# Patient Record
Sex: MALE | Race: WHITE | NOT HISPANIC OR LATINO | Employment: FULL TIME | URBAN - METROPOLITAN AREA
[De-identification: names, ages, dates, MRNs, and addresses within clinical notes are randomized per-mention and may not be internally consistent; named-entity substitution may affect disease eponyms.]

---

## 2023-11-06 PROBLEM — L03.90 CELLULITIS: Status: ACTIVE | Noted: 2023-11-06

## 2023-11-06 PROBLEM — M25.579 ANKLE JOINT PAIN: Status: ACTIVE | Noted: 2023-11-06

## 2023-11-06 PROBLEM — Z00.6 RESEARCH SUBJECT: Status: ACTIVE | Noted: 2023-11-06

## 2023-11-06 PROBLEM — S89.90XA LEG INJURY: Status: ACTIVE | Noted: 2023-11-06

## 2023-11-06 PROBLEM — S59.901A INJURY OF ELBOW, RIGHT: Status: ACTIVE | Noted: 2023-11-06

## 2023-11-06 PROBLEM — M25.571 RIGHT ANKLE PAIN: Status: ACTIVE | Noted: 2023-11-06

## 2023-11-06 RX ORDER — LISINOPRIL 10 MG/1
10 TABLET ORAL
COMMUNITY
Start: 2023-06-01

## 2023-11-06 RX ORDER — ALLOPURINOL 100 MG/1
100 TABLET ORAL
COMMUNITY
Start: 2023-06-01

## 2023-11-07 ENCOUNTER — LAB REQUISITION (OUTPATIENT)
Dept: LAB | Facility: HOSPITAL | Age: 48
End: 2023-11-07
Payer: COMMERCIAL

## 2023-11-07 ENCOUNTER — HOSPITAL ENCOUNTER (OUTPATIENT)
Dept: CARDIOLOGY | Facility: HOSPITAL | Age: 48
Discharge: HOME | End: 2023-11-07
Payer: COMMERCIAL

## 2023-11-07 ENCOUNTER — HOSPITAL ENCOUNTER (OUTPATIENT)
Dept: RADIOLOGY | Facility: HOSPITAL | Age: 48
Discharge: HOME | End: 2023-11-07
Payer: COMMERCIAL

## 2023-11-07 ENCOUNTER — CLINICAL SUPPORT (OUTPATIENT)
Dept: IMMUNOLOGY | Facility: CLINIC | Age: 48
End: 2023-11-07
Payer: COMMERCIAL

## 2023-11-07 DIAGNOSIS — Z00.6 ENCOUNTER FOR EXAMINATION FOR NORMAL COMPARISON AND CONTROL IN CLINICAL RESEARCH PROGRAM: ICD-10-CM

## 2023-11-07 DIAGNOSIS — Z00.6 RESEARCH STUDY PATIENT: ICD-10-CM

## 2023-11-07 LAB
25(OH)D3 SERPL-MCNC: 19 NG/ML (ref 30–100)
APPEARANCE UR: CLEAR
BILIRUB UR STRIP.AUTO-MCNC: NEGATIVE MG/DL
CHOLEST SERPL-MCNC: 209 MG/DL (ref 0–199)
CHOLESTEROL/HDL RATIO: 7.3
COLOR UR: YELLOW
CRP SERPL-MCNC: 0.28 MG/DL
GLUCOSE UR STRIP.AUTO-MCNC: NEGATIVE MG/DL
HDLC SERPL-MCNC: 28.5 MG/DL
KETONES UR STRIP.AUTO-MCNC: NEGATIVE MG/DL
LDLC SERPL CALC-MCNC: 123 MG/DL
LEUKOCYTE ESTERASE UR QL STRIP.AUTO: NEGATIVE
NITRITE UR QL STRIP.AUTO: NEGATIVE
NON HDL CHOLESTEROL: 181 MG/DL (ref 0–149)
PH UR STRIP.AUTO: 6 [PH]
PROT UR STRIP.AUTO-MCNC: NEGATIVE MG/DL
RBC # UR STRIP.AUTO: ABNORMAL /UL
RBC #/AREA URNS AUTO: NORMAL /HPF
SP GR UR STRIP.AUTO: 1.02
TRIGL SERPL-MCNC: 287 MG/DL (ref 0–149)
UROBILINOGEN UR STRIP.AUTO-MCNC: <2 MG/DL
VLDL: 57 MG/DL (ref 0–40)
WBC #/AREA URNS AUTO: NORMAL /HPF

## 2023-11-07 PROCEDURE — 82306 VITAMIN D 25 HYDROXY: CPT

## 2023-11-07 PROCEDURE — 81001 URINALYSIS AUTO W/SCOPE: CPT

## 2023-11-07 PROCEDURE — 93010 ELECTROCARDIOGRAM REPORT: CPT | Performed by: INTERNAL MEDICINE

## 2023-11-07 PROCEDURE — 70553 MRI BRAIN STEM W/O & W/DYE: CPT | Mod: ONBASE | Performed by: RADIOLOGY

## 2023-11-07 PROCEDURE — 93005 ELECTROCARDIOGRAM TRACING: CPT

## 2023-11-07 PROCEDURE — 70553 MRI BRAIN STEM W/O & W/DYE: CPT | Mod: ONBASE

## 2023-11-07 PROCEDURE — A9575 INJ GADOTERATE MEGLUMI 0.1ML: HCPCS | Performed by: INTERNAL MEDICINE

## 2023-11-07 PROCEDURE — 86140 C-REACTIVE PROTEIN: CPT

## 2023-11-07 PROCEDURE — 80061 LIPID PANEL: CPT

## 2023-11-07 PROCEDURE — 2550000001 HC RX 255 CONTRASTS: Performed by: INTERNAL MEDICINE

## 2023-11-07 PROCEDURE — 83036 HEMOGLOBIN GLYCOSYLATED A1C: CPT

## 2023-11-07 PROCEDURE — 71250 CT THORAX DX C-: CPT | Mod: ONBASE | Performed by: STUDENT IN AN ORGANIZED HEALTH CARE EDUCATION/TRAINING PROGRAM

## 2023-11-07 PROCEDURE — 71250 CT THORAX DX C-: CPT | Mod: ONBASE

## 2023-11-07 RX ORDER — GADOTERATE MEGLUMINE 376.9 MG/ML
15 INJECTION INTRAVENOUS
Status: COMPLETED | OUTPATIENT
Start: 2023-11-07 | End: 2023-11-07

## 2023-11-07 RX ADMIN — GADOTERATE MEGLUMINE 26 ML: 376.9 INJECTION INTRAVENOUS at 11:34

## 2023-11-08 LAB
ATRIAL RATE: 83 BPM
EST. AVERAGE GLUCOSE BLD GHB EST-MCNC: 114 MG/DL
HBA1C MFR BLD: 5.6 %
P AXIS: 1 DEGREES
P OFFSET: 186 MS
P ONSET: 139 MS
PR INTERVAL: 142 MS
Q ONSET: 210 MS
QRS COUNT: 13 BEATS
QRS DURATION: 86 MS
QT INTERVAL: 352 MS
QTC CALCULATION(BAZETT): 413 MS
QTC FREDERICIA: 392 MS
R AXIS: -38 DEGREES
T AXIS: 117 DEGREES
T OFFSET: 386 MS
VENTRICULAR RATE: 83 BPM

## 2023-11-29 ENCOUNTER — APPOINTMENT (OUTPATIENT)
Dept: OCCUPATIONAL THERAPY | Facility: HOSPITAL | Age: 48
End: 2023-11-29
Payer: COMMERCIAL

## 2024-04-03 DIAGNOSIS — Z00.6 RESEARCH STUDY PATIENT: ICD-10-CM

## 2024-06-03 ENCOUNTER — CLINICAL SUPPORT (OUTPATIENT)
Dept: GASTROENTEROLOGY | Facility: HOSPITAL | Age: 49
End: 2024-06-03

## 2024-06-03 DIAGNOSIS — Z00.6 RESEARCH STUDY PATIENT: ICD-10-CM

## 2024-06-03 PROCEDURE — 91200 LIVER ELASTOGRAPHY: CPT | Performed by: INTERNAL MEDICINE

## 2024-06-03 NOTE — LETTER
June 4, 2024     Farzaneh Winslow MD  [address] [facility] [address]    Patient: Ced Costa   YOB: 1975   Date of Visit: 6/3/2024       Dear Dr. Farzaneh Winslow MD:    Thank you for referring Ced Costa to me for evaluation. Below are my notes for this consultation.  If you have questions, please do not hesitate to call me. I look forward to following your patient along with you.       Sincerely,     [id number]      CC: No Recipients  ______________________________________________________________________________________      Results:  E (median liver stiffness measurement):  8.6    kPa  CAP (controlled attenuation parameter):  366  dB/m    Interpretation:  This was a technically adequate study. 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 The CAP score is consistent with 67 - 100 % hepatocyte steatosis (steatosis stage 3 of 3 ) .    Jeancarlos Rutledge MD  Hepatology

## 2024-06-04 NOTE — PROGRESS NOTES
Results:  E (median liver stiffness measurement):  8.6    kPa  CAP (controlled attenuation parameter):  366  dB/m    Interpretation:  This was a technically adequate study. 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 The CAP score is consistent with 67 - 100 % hepatocyte steatosis (steatosis stage 3 of 3 ) .    Jeancarlos Rutledge MD  Hepatology

## 2024-06-11 ENCOUNTER — DOCUMENTATION (OUTPATIENT)
Dept: IMMUNOLOGY | Facility: CLINIC | Age: 49
End: 2024-06-11
Payer: COMMERCIAL

## 2024-06-11 NOTE — PROGRESS NOTES
Telephone note:  left message for participant to call me back regarding the results of the Fibroscan done as part of the Covid Recover study.

## 2024-06-14 ENCOUNTER — DOCUMENTATION (OUTPATIENT)
Dept: IMMUNOLOGY | Facility: CLINIC | Age: 49
End: 2024-06-14
Payer: COMMERCIAL

## 2024-06-14 NOTE — PROGRESS NOTES
Telephone note - participant notified of results of fibroscan done as part of the Recover Covid study:  moderate fibrosis; severe steatosis.  Encouraged to follow up with PCP.    Trevor

## 2024-07-29 ENCOUNTER — LAB (OUTPATIENT)
Dept: LAB | Facility: LAB | Age: 49
End: 2024-07-29

## 2024-07-29 DIAGNOSIS — Z00.6 RESEARCH EXAM: Primary | ICD-10-CM

## 2024-07-29 DIAGNOSIS — Z00.6 RESEARCH EXAM: ICD-10-CM

## 2024-07-29 DIAGNOSIS — Z00.6 RESEARCH STUDY PATIENT: ICD-10-CM

## 2024-07-29 LAB
CREAT UR-MCNC: 206.7 MG/DL (ref 20–370)
MICROALBUMIN UR-MCNC: 26.1 MG/L
MICROALBUMIN/CREAT UR: 12.6 UG/MG CREAT

## 2024-07-29 PROCEDURE — 82570 ASSAY OF URINE CREATININE: CPT

## 2024-07-29 PROCEDURE — 82043 UR ALBUMIN QUANTITATIVE: CPT

## 2024-07-31 ENCOUNTER — LAB (OUTPATIENT)
Dept: LAB | Facility: LAB | Age: 49
End: 2024-07-31

## 2024-07-31 DIAGNOSIS — Z00.6 RESEARCH STUDY PATIENT: ICD-10-CM

## 2024-07-31 DIAGNOSIS — Z00.6 RESEARCH EXAM: ICD-10-CM

## 2024-07-31 DIAGNOSIS — Z00.6 RESEARCH EXAM: Primary | ICD-10-CM

## 2024-07-31 LAB
CREAT UR-MCNC: 141.7 MG/DL (ref 20–370)
CREAT UR-MCNC: 205.4 MG/DL (ref 20–370)
MICROALBUMIN UR-MCNC: 29.2 MG/L
MICROALBUMIN UR-MCNC: 29.5 MG/L
MICROALBUMIN/CREAT UR: 14.2 UG/MG CREAT
MICROALBUMIN/CREAT UR: 20.8 UG/MG CREAT

## 2024-07-31 PROCEDURE — 82570 ASSAY OF URINE CREATININE: CPT

## 2024-07-31 PROCEDURE — 82043 UR ALBUMIN QUANTITATIVE: CPT

## 2024-11-27 ENCOUNTER — LAB (OUTPATIENT)
Dept: LAB | Facility: LAB | Age: 49
End: 2024-11-27
Payer: COMMERCIAL

## 2024-11-27 DIAGNOSIS — Z00.6 RESEARCH STUDY PATIENT: ICD-10-CM

## 2024-11-27 LAB
25(OH)D3 SERPL-MCNC: 12 NG/ML (ref 30–100)
APPEARANCE UR: CLEAR
BILIRUB UR STRIP.AUTO-MCNC: NEGATIVE MG/DL
CHOLEST SERPL-MCNC: 200 MG/DL (ref 0–199)
CHOLESTEROL/HDL RATIO: 5.8
COLOR UR: ABNORMAL
EST. AVERAGE GLUCOSE BLD GHB EST-MCNC: 117 MG/DL
GLUCOSE UR STRIP.AUTO-MCNC: NORMAL MG/DL
HBA1C MFR BLD: 5.7 %
HDLC SERPL-MCNC: 34.2 MG/DL
KETONES UR STRIP.AUTO-MCNC: NEGATIVE MG/DL
LDLC SERPL CALC-MCNC: 95 MG/DL
LEUKOCYTE ESTERASE UR QL STRIP.AUTO: NEGATIVE
MUCOUS THREADS #/AREA URNS AUTO: NORMAL /LPF
NITRITE UR QL STRIP.AUTO: NEGATIVE
NON HDL CHOLESTEROL: 166 MG/DL (ref 0–149)
PH UR STRIP.AUTO: 5.5 [PH]
PROT UR STRIP.AUTO-MCNC: ABNORMAL MG/DL
RBC # UR STRIP.AUTO: ABNORMAL /UL
RBC #/AREA URNS AUTO: NORMAL /HPF
SP GR UR STRIP.AUTO: 1.02
SQUAMOUS #/AREA URNS AUTO: NORMAL /HPF
TRIGL SERPL-MCNC: 356 MG/DL (ref 0–149)
UROBILINOGEN UR STRIP.AUTO-MCNC: NORMAL MG/DL
VLDL: 71 MG/DL (ref 0–40)
WBC #/AREA URNS AUTO: NORMAL /HPF

## 2024-11-27 PROCEDURE — 80061 LIPID PANEL: CPT

## 2024-11-27 PROCEDURE — 81001 URINALYSIS AUTO W/SCOPE: CPT

## 2024-11-27 PROCEDURE — 82306 VITAMIN D 25 HYDROXY: CPT

## 2024-11-27 PROCEDURE — 36415 COLL VENOUS BLD VENIPUNCTURE: CPT

## 2024-11-27 PROCEDURE — 83036 HEMOGLOBIN GLYCOSYLATED A1C: CPT

## 2024-12-30 ENCOUNTER — OFFICE VISIT (OUTPATIENT)
Dept: SPORTS MEDICINE | Facility: HOSPITAL | Age: 49
End: 2024-12-30
Payer: COMMERCIAL

## 2024-12-30 ENCOUNTER — HOSPITAL ENCOUNTER (OUTPATIENT)
Dept: RADIOLOGY | Facility: HOSPITAL | Age: 49
Discharge: HOME | End: 2024-12-30
Payer: COMMERCIAL

## 2024-12-30 VITALS — DIASTOLIC BLOOD PRESSURE: 99 MMHG | SYSTOLIC BLOOD PRESSURE: 132 MMHG | HEART RATE: 77 BPM | OXYGEN SATURATION: 99 %

## 2024-12-30 DIAGNOSIS — M77.11 LATERAL EPICONDYLITIS OF RIGHT ELBOW: Primary | ICD-10-CM

## 2024-12-30 DIAGNOSIS — M25.521 RIGHT ELBOW PAIN: ICD-10-CM

## 2024-12-30 PROCEDURE — 73080 X-RAY EXAM OF ELBOW: CPT | Mod: RT

## 2024-12-30 PROCEDURE — 99214 OFFICE O/P EST MOD 30 MIN: CPT | Performed by: PEDIATRICS

## 2024-12-30 PROCEDURE — 73080 X-RAY EXAM OF ELBOW: CPT | Mod: RIGHT SIDE | Performed by: RADIOLOGY

## 2024-12-30 PROCEDURE — 99204 OFFICE O/P NEW MOD 45 MIN: CPT | Performed by: PEDIATRICS

## 2024-12-30 RX ORDER — NAPROXEN 500 MG/1
500 TABLET ORAL 2 TIMES DAILY
Qty: 60 TABLET | Refills: 0 | Status: SHIPPED | OUTPATIENT
Start: 2024-12-30 | End: 2025-01-29

## 2024-12-30 NOTE — LETTER
December 31, 2024     Cheryl Hsu MD  [address] [place] [address]   [facility], [address]  [place] [address]    Patient: Ced Costa   YOB: 1975   Date of Visit: 12/30/2024       Dear Dr. Cheryl Hsu MD:    Thank you for referring Ced Costa to me for evaluation. Below are my notes for this consultation.  If you have questions, please do not hesitate to call me. I look forward to following your patient along with you.       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    Pain outside R elbow for the past 3 weeks.  Pain when playing golf.  Pain bending and straightening.  Has applied ice to the R elbow.  Taking OTC advil as needed. No numbness or tingling.  Aching in the morning if he sleeps on the R arm. No weakness.     Plays golf.  Indoor league golfs once weekly.  Has not been golfing the past few weeks to avoid making pain worse.      Some pain at rest. Feels tight. No symptoms left.         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     • lisinopril 10 mg tablet Take 1 tablet (10 mg) by mouth once daily.       No current facility-administered medications on file prior to visit.         Allergies:  No Known Allergies     Physical Exam:    There were no vitals taken for this visit.     Vitals reviewed    General appearance: Well-appearing well-nourished  Psych: Normal mood and affect    Neuro: Normal sensation to light touch throughout the involved extremities  Vascular: No extremity edema or discoloration.  Skin: negative.  Lymphatic: no regional lymphadenopathy present.  Eyes: no conjunctival injection.      BILATERAL  ELBOW EXAM    Inspection:   Soft tissue swelling: No  Erythema: No  Effusion: No  Deformity: No    Range of motion (normal):  Flexion (130-150) full, no pain   Extension (0) full, no pain   Supination (80) full, no pain  Pronation (85) full, no pain     Palpation:  TTP Medial epicondyle no  TTP Ulnar collateral ligament no  TTP Flexor/pronator mass no  TTP Lateral epicondyle R  TTP Common extensor tendon origin R  TTP Radial head no  TTP Olecranon no  TTP Cubital tunnel / ulnar nerve no  TTP Distal biceps tendon no  TTP Proximal ulna no  TTP Proximal radius no  TTP Distal humerus no    Strength:   Flexion pain free, 5/5  Extension pain  free, 5/5  Supination pain free, 5/5  Pronation pain free, 5/5  Thumb extension pain free, 5/5  Wrist extension R lateral epicondyle pain free, 5/5  Wrist flexion pain free, 5/5   strength pain R lateral epicondyle, 5/5  Interosseous M strength pain free, 5/5    Ligament and Special tests:   Forced extension maneuver: negative   Valgus stress test at 30 for pain / laxity: negative   Milking maneuver: negative   Valgus overload extension test: negative     Nerve test:  Negative Tinel's cubital tunnel    Normal Sensation:  C8 dermatome/ulnar nerve: small finger  C7  dermatome/meidan nerve: middle finger  C6 dermatome/radial nerve: thumb       Imaging:  R elbow xrays were reviewed. Osteophytes noted. No fracture identified.   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 Exam notable for TTP lateral epicondyle of the R elbow, pain lateral epicondyle with extension of wrist.     We have recommended the following course of action:  1. Elbow strap was recommended during activity.  Wearing a neutral wrist brace when sleeping may also relieve pain at the elbow.   2. Rest from golfing, weight lifting and sport activities that cause pain to the elbow.  3. Referral to formal occupational therapy to work on shoulder and upper back strength.  This will help to improve mechanics once the patient is pain free.  4. The patient understands that injury to this area of the elbow is frequently related to improper technique. As the patient is permitted to return to activity, evaluating form is important to prevent re-injury.   Golfing injuries commonly occur due to improper form and/or advancing activity too quickly.  Working with a  or  is advised to supervise form when returning.   5. Anti-inflammatory medications were prescribed for pain relief.    Follow up in 6 weeks.      I saw and evaluated the patient. I personally obtained the key and critical portions of the history and physical exam or was physically present for key and critical portions performed by the resident/fellow. I reviewed the resident/fellow's documentation and discussed the patient with the resident/fellow. I agree with the resident/fellow's medical decision making as documented in the note.        ** Please excuse any errors in grammar or translation related to this dictation. Voice recognition software was utilized to  prepare this document. **

## 2024-12-30 NOTE — PROGRESS NOTES
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    Pain outside R elbow for the past 3 weeks.  Pain when playing golf.  Pain bending and straightening.  Has applied ice to the R elbow.  Taking OTC advil as needed. No numbness or tingling.  Aching in the morning if he sleeps on the R arm. No weakness.     Plays golf.  Indoor league golfs once weekly.  Has not been golfing the past few weeks to avoid making pain worse.      Some pain at rest. Feels tight. No symptoms left.         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      lisinopril 10 mg tablet Take 1 tablet (10 mg) by mouth once daily.       No current facility-administered medications on file prior to visit.         Allergies:  No Known Allergies     Physical Exam:    There were no vitals taken for this visit.     Vitals reviewed    General appearance: Well-appearing well-nourished  Psych: Normal mood and affect    Neuro: Normal sensation to light touch throughout the involved extremities  Vascular: No extremity edema or discoloration.  Skin:  negative.  Lymphatic: no regional lymphadenopathy present.  Eyes: no conjunctival injection.      BILATERAL  ELBOW EXAM    Inspection:   Soft tissue swelling: No  Erythema: No  Effusion: No  Deformity: No    Range of motion (normal):  Flexion (130-150) full, no pain   Extension (0) full, no pain   Supination (80) full, no pain  Pronation (85) full, no pain     Palpation:  TTP Medial epicondyle no  TTP Ulnar collateral ligament no  TTP Flexor/pronator mass no  TTP Lateral epicondyle R  TTP Common extensor tendon origin R  TTP Radial head no  TTP Olecranon no  TTP Cubital tunnel / ulnar nerve no  TTP Distal biceps tendon no  TTP Proximal ulna no  TTP Proximal radius no  TTP Distal humerus no    Strength:   Flexion pain free, 5/5  Extension pain  free, 5/5  Supination pain free, 5/5  Pronation pain free, 5/5  Thumb extension pain free, 5/5  Wrist extension R lateral epicondyle pain free, 5/5  Wrist flexion pain free, 5/5   strength pain R lateral epicondyle, 5/5  Interosseous M strength pain free, 5/5    Ligament and Special tests:   Forced extension maneuver: negative   Valgus stress test at 30 for pain / laxity: negative   Milking maneuver: negative   Valgus overload extension test: negative     Nerve test:  Negative Tinel's cubital tunnel    Normal Sensation:  C8 dermatome/ulnar nerve: small finger  C7 dermatome/meidan nerve: middle finger  C6 dermatome/radial nerve: thumb       Imaging:  R elbow xrays were reviewed. Osteophytes noted. No fracture identified.   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 Exam notable for TTP lateral epicondyle of the R elbow, pain lateral epicondyle with extension of wrist.     We have recommended the following course of action:  1. Elbow strap was recommended during activity.  Wearing a neutral  wrist brace when sleeping may also relieve pain at the elbow.   2. Rest from golfing, weight lifting and sport activities that cause pain to the elbow.  3. Referral to formal occupational therapy to work on shoulder and upper back strength.  This will help to improve mechanics once the patient is pain free.  4. The patient understands that injury to this area of the elbow is frequently related to improper technique. As the patient is permitted to return to activity, evaluating form is important to prevent re-injury.   Golfing injuries commonly occur due to improper form and/or advancing activity too quickly.  Working with a  or  is advised to supervise form when returning.   5. Anti-inflammatory medications were prescribed for pain relief.    Follow up in 6 weeks.      I saw and evaluated the patient. I personally obtained the key and critical portions of the history and physical exam or was physically present for key and critical portions performed by the resident/fellow. I reviewed the resident/fellow's documentation and discussed the patient with the resident/fellow. I agree with the resident/fellow's medical decision making as documented in the note.        ** Please excuse any errors in grammar or translation related to this dictation. Voice recognition software was utilized to prepare this document. **

## 2025-01-24 DIAGNOSIS — Z00.6 RESEARCH EXAM: ICD-10-CM

## 2025-01-24 RX ORDER — ALBUTEROL SULFATE 0.83 MG/ML
3 SOLUTION RESPIRATORY (INHALATION) ONCE
OUTPATIENT
Start: 2025-01-24 | End: 2025-01-24

## 2025-01-24 RX ORDER — ALBUTEROL SULFATE 90 UG/1
4 INHALANT RESPIRATORY (INHALATION) ONCE
OUTPATIENT
Start: 2025-01-24

## 2025-03-31 ENCOUNTER — APPOINTMENT (OUTPATIENT)
Dept: RADIOLOGY | Facility: HOSPITAL | Age: 50
End: 2025-03-31
Payer: COMMERCIAL

## 2025-03-31 ENCOUNTER — APPOINTMENT (OUTPATIENT)
Dept: RESPIRATORY THERAPY | Facility: HOSPITAL | Age: 50
End: 2025-03-31
Payer: COMMERCIAL

## 2025-06-03 ENCOUNTER — HOSPITAL ENCOUNTER (OUTPATIENT)
Dept: RESPIRATORY THERAPY | Facility: HOSPITAL | Age: 50
Discharge: HOME | End: 2025-06-03
Payer: COMMERCIAL

## 2025-06-03 ENCOUNTER — HOSPITAL ENCOUNTER (OUTPATIENT)
Dept: RADIOLOGY | Facility: HOSPITAL | Age: 50
Discharge: HOME | End: 2025-06-03
Payer: COMMERCIAL

## 2025-06-03 DIAGNOSIS — Z00.6 ENCOUNTER FOR EXAMINATION FOR NORMAL COMPARISON AND CONTROL IN CLINICAL RESEARCH PROGRAM: ICD-10-CM

## 2025-06-03 DIAGNOSIS — Z00.6 RESEARCH EXAM: ICD-10-CM

## 2025-06-03 LAB
MGC ASCENT PFT - FEV1 - POST: 4.96
MGC ASCENT PFT - FEV1 - PRE: 4.83
MGC ASCENT PFT - FEV1 - PREDICTED: 4.39
MGC ASCENT PFT - FVC - POST: 6.2
MGC ASCENT PFT - FVC - PRE: 6.26
MGC ASCENT PFT - FVC - PREDICTED: 5.57

## 2025-06-03 PROCEDURE — 71250 CT THORAX DX C-: CPT

## 2025-07-24 ENCOUNTER — HOSPITAL ENCOUNTER (OUTPATIENT)
Dept: NEUROLOGY | Facility: HOSPITAL | Age: 50
Discharge: HOME | End: 2025-07-24
Payer: COMMERCIAL

## 2025-07-25 DIAGNOSIS — Z00.6 RESEARCH STUDY PATIENT: ICD-10-CM
